# Patient Record
Sex: FEMALE | Race: OTHER | HISPANIC OR LATINO | URBAN - METROPOLITAN AREA
[De-identification: names, ages, dates, MRNs, and addresses within clinical notes are randomized per-mention and may not be internally consistent; named-entity substitution may affect disease eponyms.]

---

## 2023-10-06 ENCOUNTER — EMERGENCY (EMERGENCY)
Facility: HOSPITAL | Age: 43
LOS: 1 days | Discharge: HOME | End: 2023-10-06
Attending: EMERGENCY MEDICINE
Payer: MEDICAID

## 2023-10-06 VITALS
DIASTOLIC BLOOD PRESSURE: 80 MMHG | RESPIRATION RATE: 18 BRPM | HEART RATE: 61 BPM | OXYGEN SATURATION: 99 % | TEMPERATURE: 99 F | SYSTOLIC BLOOD PRESSURE: 134 MMHG

## 2023-10-06 VITALS
RESPIRATION RATE: 15 BRPM | OXYGEN SATURATION: 100 % | TEMPERATURE: 98 F | DIASTOLIC BLOOD PRESSURE: 86 MMHG | SYSTOLIC BLOOD PRESSURE: 135 MMHG | WEIGHT: 200.62 LBS | HEART RATE: 78 BPM | HEIGHT: 64 IN

## 2023-10-06 LAB
ALBUMIN SERPL ELPH-MCNC: 4.3 G/DL — SIGNIFICANT CHANGE UP (ref 3.3–5.2)
ALP SERPL-CCNC: 109 U/L — SIGNIFICANT CHANGE UP (ref 40–120)
ALT FLD-CCNC: 36 U/L — HIGH
ANION GAP SERPL CALC-SCNC: 14 MMOL/L — SIGNIFICANT CHANGE UP (ref 5–17)
APPEARANCE UR: ABNORMAL
AST SERPL-CCNC: 29 U/L — SIGNIFICANT CHANGE UP
BACTERIA # UR AUTO: ABNORMAL
BASOPHILS # BLD AUTO: 0.06 K/UL — SIGNIFICANT CHANGE UP (ref 0–0.2)
BASOPHILS NFR BLD AUTO: 0.9 % — SIGNIFICANT CHANGE UP (ref 0–2)
BILIRUB SERPL-MCNC: 0.4 MG/DL — SIGNIFICANT CHANGE UP (ref 0.4–2)
BILIRUB UR-MCNC: NEGATIVE — SIGNIFICANT CHANGE UP
BUN SERPL-MCNC: 10.5 MG/DL — SIGNIFICANT CHANGE UP (ref 8–20)
CALCIUM SERPL-MCNC: 9.3 MG/DL — SIGNIFICANT CHANGE UP (ref 8.4–10.5)
CHLORIDE SERPL-SCNC: 100 MMOL/L — SIGNIFICANT CHANGE UP (ref 96–108)
CO2 SERPL-SCNC: 21 MMOL/L — LOW (ref 22–29)
COD CRY URNS QL: ABNORMAL
COLOR SPEC: YELLOW — SIGNIFICANT CHANGE UP
CREAT SERPL-MCNC: 0.48 MG/DL — LOW (ref 0.5–1.3)
DIFF PNL FLD: ABNORMAL
EGFR: 121 ML/MIN/1.73M2 — SIGNIFICANT CHANGE UP
EOSINOPHIL # BLD AUTO: 0.28 K/UL — SIGNIFICANT CHANGE UP (ref 0–0.5)
EOSINOPHIL NFR BLD AUTO: 4 % — SIGNIFICANT CHANGE UP (ref 0–6)
EPI CELLS # UR: SIGNIFICANT CHANGE UP
GLUCOSE SERPL-MCNC: 188 MG/DL — HIGH (ref 70–99)
GLUCOSE UR QL: 1000 MG/DL
HCG SERPL-ACNC: <4 MIU/ML — SIGNIFICANT CHANGE UP
HCT VFR BLD CALC: 45.4 % — HIGH (ref 34.5–45)
HGB BLD-MCNC: 15.1 G/DL — SIGNIFICANT CHANGE UP (ref 11.5–15.5)
HIV 1 & 2 AB SERPL IA.RAPID: SIGNIFICANT CHANGE UP
IMM GRANULOCYTES NFR BLD AUTO: 0.6 % — SIGNIFICANT CHANGE UP (ref 0–0.9)
KETONES UR-MCNC: ABNORMAL
LEUKOCYTE ESTERASE UR-ACNC: ABNORMAL
LYMPHOCYTES # BLD AUTO: 2.16 K/UL — SIGNIFICANT CHANGE UP (ref 1–3.3)
LYMPHOCYTES # BLD AUTO: 31.1 % — SIGNIFICANT CHANGE UP (ref 13–44)
MCHC RBC-ENTMCNC: 25.2 PG — LOW (ref 27–34)
MCHC RBC-ENTMCNC: 33.3 GM/DL — SIGNIFICANT CHANGE UP (ref 32–36)
MCV RBC AUTO: 75.7 FL — LOW (ref 80–100)
MONOCYTES # BLD AUTO: 0.58 K/UL — SIGNIFICANT CHANGE UP (ref 0–0.9)
MONOCYTES NFR BLD AUTO: 8.3 % — SIGNIFICANT CHANGE UP (ref 2–14)
NEUTROPHILS # BLD AUTO: 3.83 K/UL — SIGNIFICANT CHANGE UP (ref 1.8–7.4)
NEUTROPHILS NFR BLD AUTO: 55.1 % — SIGNIFICANT CHANGE UP (ref 43–77)
NITRITE UR-MCNC: NEGATIVE — SIGNIFICANT CHANGE UP
PH UR: 6 — SIGNIFICANT CHANGE UP (ref 5–8)
PLATELET # BLD AUTO: 249 K/UL — SIGNIFICANT CHANGE UP (ref 150–400)
POTASSIUM SERPL-MCNC: 4.1 MMOL/L — SIGNIFICANT CHANGE UP (ref 3.5–5.3)
POTASSIUM SERPL-SCNC: 4.1 MMOL/L — SIGNIFICANT CHANGE UP (ref 3.5–5.3)
PROT SERPL-MCNC: 8.1 G/DL — SIGNIFICANT CHANGE UP (ref 6.6–8.7)
PROT UR-MCNC: 30 MG/DL
RBC # BLD: 6 M/UL — HIGH (ref 3.8–5.2)
RBC # FLD: 13.6 % — SIGNIFICANT CHANGE UP (ref 10.3–14.5)
RBC CASTS # UR COMP ASSIST: ABNORMAL /HPF (ref 0–4)
SODIUM SERPL-SCNC: 135 MMOL/L — SIGNIFICANT CHANGE UP (ref 135–145)
SP GR SPEC: 1.02 — SIGNIFICANT CHANGE UP (ref 1.01–1.02)
UROBILINOGEN FLD QL: NEGATIVE MG/DL — SIGNIFICANT CHANGE UP
WBC # BLD: 6.95 K/UL — SIGNIFICANT CHANGE UP (ref 3.8–10.5)
WBC # FLD AUTO: 6.95 K/UL — SIGNIFICANT CHANGE UP (ref 3.8–10.5)
WBC UR QL: ABNORMAL /HPF (ref 0–5)

## 2023-10-06 PROCEDURE — 81001 URINALYSIS AUTO W/SCOPE: CPT

## 2023-10-06 PROCEDURE — 36415 COLL VENOUS BLD VENIPUNCTURE: CPT

## 2023-10-06 PROCEDURE — 80053 COMPREHEN METABOLIC PANEL: CPT

## 2023-10-06 PROCEDURE — 85025 COMPLETE CBC W/AUTO DIFF WBC: CPT

## 2023-10-06 PROCEDURE — 84702 CHORIONIC GONADOTROPIN TEST: CPT

## 2023-10-06 PROCEDURE — 87086 URINE CULTURE/COLONY COUNT: CPT

## 2023-10-06 PROCEDURE — 96374 THER/PROPH/DIAG INJ IV PUSH: CPT

## 2023-10-06 PROCEDURE — 99284 EMERGENCY DEPT VISIT MOD MDM: CPT | Mod: 25

## 2023-10-06 PROCEDURE — 87186 SC STD MICRODIL/AGAR DIL: CPT

## 2023-10-06 PROCEDURE — 86703 HIV-1/HIV-2 1 RESULT ANTBDY: CPT

## 2023-10-06 PROCEDURE — 99284 EMERGENCY DEPT VISIT MOD MDM: CPT

## 2023-10-06 PROCEDURE — T1013: CPT

## 2023-10-06 RX ORDER — KETOROLAC TROMETHAMINE 30 MG/ML
30 SYRINGE (ML) INJECTION ONCE
Refills: 0 | Status: DISCONTINUED | OUTPATIENT
Start: 2023-10-06 | End: 2023-10-06

## 2023-10-06 RX ORDER — CEFPODOXIME PROXETIL 100 MG
100 TABLET ORAL ONCE
Refills: 0 | Status: COMPLETED | OUTPATIENT
Start: 2023-10-06 | End: 2023-10-06

## 2023-10-06 RX ORDER — CEFPODOXIME PROXETIL 100 MG
1 TABLET ORAL
Qty: 14 | Refills: 0
Start: 2023-10-06 | End: 2023-10-12

## 2023-10-06 RX ORDER — ACETAMINOPHEN 500 MG
650 TABLET ORAL ONCE
Refills: 0 | Status: COMPLETED | OUTPATIENT
Start: 2023-10-06 | End: 2023-10-06

## 2023-10-06 RX ADMIN — Medication 30 MILLIGRAM(S): at 13:01

## 2023-10-06 RX ADMIN — Medication 100 MILLIGRAM(S): at 19:27

## 2023-10-06 RX ADMIN — Medication 650 MILLIGRAM(S): at 19:27

## 2023-10-06 NOTE — ED ADULT NURSE NOTE - OBJECTIVE STATEMENT
Pt received A&Ox4 in  with  Pro at bedside. Pt c/o lower abd and lower back pain x 4 days associated with nausea and diarrhea. Pt states "I feel hot when I pee". Hx of kidney stones. Pt denied any fevers/chills. Respirations even & unlabored. NAD. Pt made aware of plan of care and verbalized understanding.

## 2023-10-06 NOTE — ED ADULT NURSE NOTE - NSFALLUNIVINTERV_ED_ALL_ED
Bed/Stretcher in lowest position, wheels locked, appropriate side rails in place/Call bell, personal items and telephone in reach/Instruct patient to call for assistance before getting out of bed/chair/stretcher/Non-slip footwear applied when patient is off stretcher/[place] to call system/Physically safe environment - no spills, clutter or unnecessary equipment/Purposeful proactive rounding/Room/bathroom lighting operational, light cord in reach

## 2023-10-06 NOTE — ED PROVIDER NOTE - CLINICAL SUMMARY MEDICAL DECISION MAKING FREE TEXT BOX
lab results reviewed with patient Patient p/w lower back pain and lower abdominal pain a/w dysuria. Lab results reviewed: CBC and CMP with no significant abnormalities; UA shows small leuk, 11-25 WBC, trace blood. Will treat for UTI with Vantin, dose given in ED. Patient well appearing, NAD, non-toxic appearing. No further workup indicated at this time. Supportive care. Follow up with PCP. Return precautions discussed. Patient verbally demonstrated understanding of results and plan. Patient stable for discharge.

## 2023-10-06 NOTE — ED ADULT NURSE NOTE - SUICIDE SCREENING DEPRESSION
Detail Level: Detailed Removed With: scissors Consent: - Verbal and written consent was obtained, and risks were reviewed prior to procedure today. \\n- Risks discussed include but are not limited to bleeding, pigmentary change, infection, pain, and remote possibility of scarring. \\n- The patient understands that the procedure is cosmetic in nature and is not covered by or billable to insurance. Anesthesia Volume In Cc: 0.5 Negative Price (Use Numbers Only, No Special Characters Or $): 150 Anesthesia Type: 2% lidocaine with epinephrine

## 2023-10-06 NOTE — ED PROVIDER NOTE - PATIENT PORTAL LINK FT
You can access the FollowMyHealth Patient Portal offered by [facility] by registering at the following website: [email or web address]. By joining [facility]’s FollowMyHealth portal, you will also be able to view your health information using other applications (apps) compatible with our system.

## 2023-10-06 NOTE — ED PROVIDER NOTE - PROGRESS NOTE DETAILS
PT evaluated by intake physician. HPI/ROS/PE as noted above.    Lab results reviewed: CBC and CMP with no significant abnormalities; UA shows small leuk, 11-25 WBC, trace blood. Will treat for UTI with Vantin, dose given in ED. Patient well appearing, NAD, non-toxic appearing. No further workup indicated at this time. Supportive care. Follow up with PCP. Return precautions discussed. Patient verbally demonstrated understanding of results and plan. Patient stable for discharge. PT evaluated by intake physician. HPI/ROS/PE as noted above.    Lab results reviewed: CBC and CMP with no significant abnormalities; UA shows small leuk, 11-25 WBC, trace blood. Will treat for UTI with Vantin, dose given in ED. Patient re-assessed: abdomen soft, non-tender; no CVAT bilat. Patient well appearing, NAD, non-toxic appearing. No further workup indicated at this time. Supportive care. Follow up with PCP. Return precautions discussed. Patient verbally demonstrated understanding of results and plan. Patient stable for discharge.

## 2023-10-06 NOTE — ED PROVIDER NOTE - NSFOLLOWUPINSTRUCTIONS_ED_ALL_ED_FT
Fill your prescription and take as directed. Call to make an appointment to follow up with your primary care provider. Return to ER if you develop high fever, worsening pain, excessive vomiting or other worsening symptoms.     Llene bledsoe receta y tómela según las indicaciones. Llame para programar bryce kan de seguimiento con bledsoe proveedor de atención primaria. Regrese a la juan de emergencias si presenta fiebre ekaterina, dolor que empeora, vómitos excesivos u otros síntomas que empeoran.

## 2023-10-06 NOTE — ED PROVIDER NOTE - NS ED ATTENDING STATEMENT MOD
This was a shared visit with the NAHED. I reviewed and verified the documentation and independently performed the documented:

## 2023-10-06 NOTE — ED ADULT TRIAGE NOTE - AS PAIN REST
Do not drive or operate machinery/Showering allowed/Walking - Indoors allowed/No heavy lifting/straining/Walking - Outdoors allowed/Follow Instructions Provided by your Surgical Team
6 (moderate pain)

## 2023-10-06 NOTE — ED ADULT TRIAGE NOTE - CHIEF COMPLAINT QUOTE
Pt with lower back pain, lower abdomina pain, nausea, vomiting, diarrhea and burning with urination for the last few days. Pt also reports fever last night

## 2023-11-14 ENCOUNTER — EMERGENCY (EMERGENCY)
Facility: HOSPITAL | Age: 43
LOS: 1 days | Discharge: HOME | End: 2023-11-14
Attending: STUDENT IN AN ORGANIZED HEALTH CARE EDUCATION/TRAINING PROGRAM
Payer: MEDICAID

## 2023-11-14 VITALS
TEMPERATURE: 98 F | DIASTOLIC BLOOD PRESSURE: 89 MMHG | SYSTOLIC BLOOD PRESSURE: 197 MMHG | WEIGHT: 205.03 LBS | HEART RATE: 87 BPM | RESPIRATION RATE: 20 BRPM | OXYGEN SATURATION: 98 % | HEIGHT: 64 IN

## 2023-11-14 VITALS
HEART RATE: 80 BPM | OXYGEN SATURATION: 99 % | RESPIRATION RATE: 18 BRPM | SYSTOLIC BLOOD PRESSURE: 149 MMHG | DIASTOLIC BLOOD PRESSURE: 84 MMHG

## 2023-11-14 PROCEDURE — 99282 EMERGENCY DEPT VISIT SF MDM: CPT

## 2023-11-14 PROCEDURE — T1013: CPT

## 2023-11-14 PROCEDURE — 99283 EMERGENCY DEPT VISIT LOW MDM: CPT

## 2023-11-14 RX ORDER — ACETAMINOPHEN 500 MG
650 TABLET ORAL ONCE
Refills: 0 | Status: COMPLETED | OUTPATIENT
Start: 2023-11-14 | End: 2023-11-14

## 2023-11-14 RX ADMIN — Medication 650 MILLIGRAM(S): at 21:38

## 2023-11-14 NOTE — ED PROVIDER NOTE - OBJECTIVE STATEMENT
43F otherwise healthy presents following a 10 minute nosebleed which spontaneously resolved. Afterwards she developed a mild headache. Denies lightheadedness, CP, SOB. Does not take AC

## 2023-11-14 NOTE — ED ADULT TRIAGE NOTE - CHIEF COMPLAINT QUOTE
Pt arrives to ED c/o spontaneous nose bleed . Pt hypertensive - denies Hx of HTN Pt arrives to ED c/o spontaneous nose bleed  fallowed by the headache . Pt hypertensive - denies Hx of HTN

## 2023-11-14 NOTE — ED PROVIDER NOTE - CLINICAL SUMMARY MEDICAL DECISION MAKING FREE TEXT BOX
43F presents after a 10 min episode of epistaxis which has since resolved. Well appearing, without further complaints. Pt was given Tylenol for her mild likely tension HA and discharged in stable condition. Her BP was noted to be high, and was advised to f/u with her PMD - she expressed understanding

## 2023-11-14 NOTE — ED PROVIDER NOTE - PHYSICAL EXAMINATION
General: Awake, alert, well appearing  HEENT: Small amount of dried blood around R nare. EOMI. No scleral icterus or conjunctival injection. Moist mucous membranes.   Neck:. Soft and supple. Trachea midline  Cardiac: Extremities warm and well perfused. No LE edema.  Resp: No respiratory distress or accessory muscle use.   Abd: Soft, non-distended. No overlying skin changes  Skin: No rashes, abrasions, or lacerations.  Neuro: AO x 4. Moves all extremities symmetrically. Motor strength and sensation grossly intact.  Psych: Appropriate mood and affect

## 2023-11-14 NOTE — ED PROVIDER NOTE - MDM ORDERS SUBMITTED SELECTION
Dr. John Cervantes: Please review/sign pended order for [address] for [address] [facility] for EBUS. Medications

## 2023-11-14 NOTE — ED PROVIDER NOTE - PATIENT PORTAL LINK FT
You can access the FollowMyHealth Patient Portal offered by [facility] by registering at the following website: [email or web address]. By joining [facility]’s FollowMyHealth portal, you will also be able to view your health information using other applications (apps) compatible with our system.

## 2023-11-14 NOTE — ED ADULT NURSE NOTE - CHIEF COMPLAINT QUOTE
Pt arrives to ED c/o spontaneous nose bleed  fallowed by the headache . Pt hypertensive - denies Hx of HTN

## 2023-11-14 NOTE — ED PROVIDER NOTE - ATTENDING CONTRIBUTION TO CARE
43y F w/ no significant PMH presents for nosebleed. Nosebleed resolved with direct pressure prior to my evaluation. Pt complains of mild headache, otherwise denies complaints. On exam, pt in no distress, no active bleeding from nares, no focal neuro deficits. Initially hypertensive on arrival; rpt BP improved without intervention. Medically stable for discharge.

## 2023-11-15 PROBLEM — N20.0 CALCULUS OF KIDNEY: Chronic | Status: ACTIVE | Noted: 2023-10-06

## 2024-03-20 NOTE — ED PROVIDER NOTE - NSFOLLOWUPINSTRUCTIONS_ED_ALL_ED_FT
Assisted in chart review.     
Maggie already call the patient and informed her that the new order where place, and also she call the lab to let  them know   
Patient contacted office questioning if results are in for stool study Giardia lamblia EIA and Ova and parasites. As per Epic if looks like test was not completed. Patient is going to the lab for specimen cup and will complete again.  
Pt called to ask for a new order for her giardia test. I called the LM office and they will call the pt when it is complete  
Epistaxis    Epistaxis es el término médico para bryce hemorragia nasal. Las hemorragias nasales son comunes y pueden ser causadas por muchas afecciones, merary lesiones, infecciones, ambientes secos, medicamentos, hurgarse la nariz y sistemas de calefacción y refrigeración del hogar. Intente controlar el sangrado nasal pellizcando bledsoe nariz continuamente alexa al menos 10 minutos. Evite acostarse mientras tenga bryce hemorragia nasal. Siéntate e inclínate hacia adelante. Evite sonarse o olerse la nariz alexa varias horas después de ayden tenido bryce hemorragia nasal. Reanude socrates actividades normales tanto merary pueda, ruddy evite esforzarse, levantar objetos o doblarse por la cintura alexa varios días. Mantenga la humedad en bledsoe hogar usando menos aire acondicionado o usando un humidificador.    Si bledsoe proveedor de atención médica le tapó la nariz, manténgalo dentro de la nariz hasta que un proveedor de atención médica se lo retire. Si se utilizó un catéter con balón para taparle la nariz, no lo olivier ni lo retire a menos que bledsoe proveedor de atención médica se lo haya indicado.    La aspirina y los anticoagulantes aumentan la probabilidad de sangrado. Si le recetan estos medicamentos y sufre hemorragias nasales, pregúntele a bledsoe proveedor de atención médica si debe dejar de tomarlos o ajustar la dosis. No suspenda los medicamentos a menos que se lo indique bledsoe proveedor de atención médica.    BUSQUE ATENCIÓN MÉDICA INMEDIATA SI TIENE ALGUNO DE LOS SIGUIENTES SÍNTOMAS: hemorragia nasal que dura más de 20 minutos, sangrado inusual o hematomas en otras partes del cuerpo, mareos o aturdimiento, desmayos, hemorragia nasal que ocurre después de bryce lesión en la brenda o fiebre.
